# Patient Record
Sex: FEMALE
[De-identification: names, ages, dates, MRNs, and addresses within clinical notes are randomized per-mention and may not be internally consistent; named-entity substitution may affect disease eponyms.]

---

## 2019-11-08 ENCOUNTER — HOSPITAL ENCOUNTER (EMERGENCY)
Dept: HOSPITAL 97 - ER | Age: 34
Discharge: HOME | End: 2019-11-08
Payer: SELF-PAY

## 2019-11-08 VITALS — DIASTOLIC BLOOD PRESSURE: 89 MMHG | SYSTOLIC BLOOD PRESSURE: 127 MMHG | OXYGEN SATURATION: 100 %

## 2019-11-08 VITALS — TEMPERATURE: 98.4 F
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
## 2019-11-08 DIAGNOSIS — S16.1XXA: Primary | ICD-10-CM

## 2019-11-08 DIAGNOSIS — M79.604: ICD-10-CM

## 2019-11-08 PROCEDURE — 72125 CT NECK SPINE W/O DYE: CPT

## 2019-11-08 PROCEDURE — 99284 EMERGENCY DEPT VISIT MOD MDM: CPT

## 2019-11-08 PROCEDURE — 70450 CT HEAD/BRAIN W/O DYE: CPT

## 2019-11-08 PROCEDURE — 71250 CT THORAX DX C-: CPT

## 2019-11-08 PROCEDURE — 81025 URINE PREGNANCY TEST: CPT

## 2019-11-08 PROCEDURE — 81003 URINALYSIS AUTO W/O SCOPE: CPT

## 2019-11-08 PROCEDURE — 72170 X-RAY EXAM OF PELVIS: CPT
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
